# Patient Record
Sex: MALE | Race: WHITE | NOT HISPANIC OR LATINO | Employment: OTHER | URBAN - METROPOLITAN AREA
[De-identification: names, ages, dates, MRNs, and addresses within clinical notes are randomized per-mention and may not be internally consistent; named-entity substitution may affect disease eponyms.]

---

## 2024-02-19 ENCOUNTER — TELEPHONE (OUTPATIENT)
Dept: PRIMARY CARE | Facility: CLINIC | Age: 64
End: 2024-02-19

## 2024-02-19 ENCOUNTER — APPOINTMENT (OUTPATIENT)
Dept: PRIMARY CARE | Facility: CLINIC | Age: 64
End: 2024-02-19
Payer: COMMERCIAL

## 2024-02-21 RX ORDER — METOPROLOL TARTRATE 25 MG/1
TABLET, FILM COATED ORAL DAILY
COMMUNITY
Start: 2024-02-16

## 2024-02-21 RX ORDER — ACETAMINOPHEN 500 MG
1000 TABLET ORAL EVERY 8 HOURS
COMMUNITY
Start: 2023-09-25 | End: 2024-02-22 | Stop reason: ALTCHOICE

## 2024-02-21 RX ORDER — SILDENAFIL 100 MG/1
TABLET, FILM COATED ORAL
COMMUNITY
Start: 2024-01-11

## 2024-02-21 RX ORDER — CYCLOBENZAPRINE HCL 10 MG
TABLET ORAL
COMMUNITY
Start: 2023-10-11 | End: 2024-02-22 | Stop reason: ALTCHOICE

## 2024-02-21 RX ORDER — HYDROCHLOROTHIAZIDE 12.5 MG/1
CAPSULE ORAL
COMMUNITY
Start: 2023-07-07 | End: 2024-02-29 | Stop reason: ALTCHOICE

## 2024-02-21 RX ORDER — ATORVASTATIN CALCIUM 80 MG/1
TABLET, FILM COATED ORAL
COMMUNITY
Start: 2023-12-12

## 2024-02-21 RX ORDER — LISINOPRIL 20 MG/1
TABLET ORAL
COMMUNITY
Start: 2023-04-24 | End: 2024-02-29 | Stop reason: ALTCHOICE

## 2024-02-21 RX ORDER — FOLIC ACID 1 MG/1
TABLET ORAL
COMMUNITY
Start: 2023-09-26 | End: 2024-02-22 | Stop reason: ALTCHOICE

## 2024-02-21 RX ORDER — LISINOPRIL AND HYDROCHLOROTHIAZIDE 12.5; 2 MG/1; MG/1
TABLET ORAL
COMMUNITY
Start: 2023-12-16

## 2024-02-21 RX ORDER — POLYETHYLENE GLYCOL-3350 AND ELECTROLYTES 236; 6.74; 5.86; 2.97; 22.74 G/274.31G; G/274.31G; G/274.31G; G/274.31G; G/274.31G
POWDER, FOR SOLUTION ORAL
COMMUNITY
Start: 2023-03-07 | End: 2024-02-22 | Stop reason: ALTCHOICE

## 2024-02-21 RX ORDER — EZETIMIBE 10 MG/1
TABLET ORAL
COMMUNITY
Start: 2023-12-12

## 2024-02-21 RX ORDER — PREDNISONE 10 MG/1
TABLET ORAL
COMMUNITY
Start: 2023-10-12 | End: 2024-02-29 | Stop reason: ALTCHOICE

## 2024-02-22 ENCOUNTER — OFFICE VISIT (OUTPATIENT)
Dept: PRIMARY CARE | Facility: CLINIC | Age: 64
End: 2024-02-22
Payer: COMMERCIAL

## 2024-02-22 VITALS
DIASTOLIC BLOOD PRESSURE: 74 MMHG | BODY MASS INDEX: 34.44 KG/M2 | HEART RATE: 62 BPM | SYSTOLIC BLOOD PRESSURE: 157 MMHG | HEIGHT: 71 IN | WEIGHT: 246 LBS

## 2024-02-22 DIAGNOSIS — Z00.00 WELL ADULT EXAM: Primary | ICD-10-CM

## 2024-02-22 PROBLEM — I10 PRIMARY HYPERTENSION: Status: ACTIVE | Noted: 2017-01-27

## 2024-02-22 PROBLEM — E66.811 OBESITY, CLASS I, BMI 30-34.9: Status: ACTIVE | Noted: 2018-12-06

## 2024-02-22 PROBLEM — R26.9 ABNORMALITY OF GAIT: Status: ACTIVE | Noted: 2023-09-27

## 2024-02-22 PROBLEM — E66.9 OBESITY, CLASS I, BMI 30-34.9: Status: ACTIVE | Noted: 2018-12-06

## 2024-02-22 PROBLEM — N50.89 PERINEAL MASS IN MALE: Status: ACTIVE | Noted: 2021-09-24

## 2024-02-22 PROBLEM — M54.50 ACUTE MIDLINE LOW BACK PAIN WITHOUT SCIATICA: Status: ACTIVE | Noted: 2023-09-27

## 2024-02-22 PROBLEM — M54.9 INTRACTABLE BACK PAIN: Status: ACTIVE | Noted: 2023-09-23

## 2024-02-22 PROBLEM — F10.10 ALCOHOL ABUSE: Status: ACTIVE | Noted: 2022-08-15

## 2024-02-22 PROBLEM — E78.2 MIXED HYPERLIPIDEMIA: Status: ACTIVE | Noted: 2024-02-22

## 2024-02-22 PROCEDURE — 3077F SYST BP >= 140 MM HG: CPT | Performed by: FAMILY MEDICINE

## 2024-02-22 PROCEDURE — 1036F TOBACCO NON-USER: CPT | Performed by: FAMILY MEDICINE

## 2024-02-22 PROCEDURE — 3078F DIAST BP <80 MM HG: CPT | Performed by: FAMILY MEDICINE

## 2024-02-22 PROCEDURE — 99396 PREV VISIT EST AGE 40-64: CPT | Performed by: FAMILY MEDICINE

## 2024-02-22 RX ORDER — NITROGLYCERIN 0.4 MG/1
0.4 TABLET SUBLINGUAL
COMMUNITY
Start: 2023-10-02

## 2024-02-22 RX ORDER — DICLOFENAC SODIUM 10 MG/G
4 GEL TOPICAL 3 TIMES DAILY
COMMUNITY
End: 2024-02-22 | Stop reason: ALTCHOICE

## 2024-02-22 RX ORDER — ASPIRIN 81 MG/1
81 TABLET ORAL 2 TIMES DAILY
COMMUNITY
Start: 2015-03-10

## 2024-02-22 ASSESSMENT — ENCOUNTER SYMPTOMS
LOSS OF SENSATION IN FEET: 0
DEPRESSION: 1
OCCASIONAL FEELINGS OF UNSTEADINESS: 0

## 2024-02-22 NOTE — PROGRESS NOTES
Pt is here to [facility], pt has concerns of severe sweating and back pain.       Chief Complaint:  No chief complaint on file.  History Of Present Illness:   Arie Allen is a 63 y.o. male presenting as new patient          Pt retired early from work (Dec 29, 2023) due to this lower back pain. Pt is concerned about excessive scalp sweat and back sweat for 5 min then it resolves, occurring daily this month. Pt endorses increased stress lately after quitting his job (financial stressors) and his dog is in poor health, states increased anxiety for the past few weeks. He endorses anxiety attacks that last for a few minutes which he describes as “works himself up and can't stop thinking/talking about a problem.” Denies heart palpitations. He believes the sweating is related to the anxiety. Recent gain of weight (20lb) in past few months. Reports impaired sleep, difficult to fall asleep and stay asleep. Endorses difficulty relaxing, increased forgetfulness, and concentrating. No sweating with chest pain.    Reports drank last 2/9, denies binge drinking. Denies withdrawal symptoms including tremors. Pt recently weaned himself from alcohol. Denies craving alcohol.    Pt reports back pain is a stable ache. Pt states back injury in fall 2023 impacting L5-S1. Received steroid injection shots through [facility] (last shot Nov 6 2023). Saw pain doctor and spine surgeon in the past. Xray and MRI lumbar spine completed 10/23     PMH: CAD, MI 2009 with 2 stents, HTN, kidney stone, HLD, ED     PSH: Lumbar steroid injection (Nov 6 2023), cardiac stents, cyst removal     Healthcare team: Previously followed with [facility], establishing with cardiology at  2/29  ALL: NKDA  SH: Pt reports alcohol abuse previously (drank 4 doubles of [place] daily); current daily MJ use.  Lives with his wife     FH: father- heart disease, mother- cancer (uncertain)  Tobacco: Never smoker    Work: Manager (now retired).  He states that due to his  "back pain and the need to take certain medications including pain medicines he decided to stop working recently and that might be a stressor for him.     Immunizations:   - utd per pt.     Colonoscopy: 5/18/23           Review of Systems:     Negative  Constitutional:   - fever   - chills   - unexpected weight change     Eyes:   - loss of vision     Ear/Nose/Throat/Mouth:   - sore throat     Cardiovascular:   - chest pain  - palpitations       Respiratory:   - shortness of breath  - difficulty breathing  - frequent cough  - wheezing     Neurological:   - loss of consciousness    Gastrointestinal:   - abdominal pain  - nausea  - vomiting  - constipation  - diarrhea    Genitourinary:   - urinary incontinence         Positive  - changes in sleep  - night sweats   - back pain  - endorses feelings of anxiety         Last Recorded Vitals:  Vitals:    02/22/24 1537   BP: 157/74   Pulse: 62   Weight: 112 kg (246 lb)   Height: 1.803 m (5' 11\")        Past Medical History:  He has no past medical history on file.     Past Surgical History:  He has no past surgical history on file.     Social History:  He reports that he has never smoked. He has never used smokeless tobacco. He reports current drug use. Frequency: 8.00 times per week. Drug: Marijuana. No history on file for alcohol use.     Family History:  No family history on file.  Allergies:  Patient has no known allergies.     Outpatient Medications:  Current Outpatient Medications   Medication Instructions    aspirin (ECOTRIN LOW STRENGTH) 81 mg, oral, 2 times daily    atorvastatin (Lipitor) 80 mg tablet     ezetimibe (Zetia) 10 mg tablet     hydroCHLOROthiazide (Microzide) 12.5 mg capsule     lisinopril 20 mg tablet     lisinopriL-hydrochlorothiazide 20-12.5 mg tablet     metoprolol tartrate (Lopressor) 25 mg tablet     nitroglycerin (NITROSTAT) 0.4 mg, sublingual    predniSONE (Deltasone) 10 mg tablet TAKE 6 TABLETS BY MOUTH ONCE DAILY FOR 2 DAYS  THEN TAKE 5 TABLETS " "ONCE DAILY FOR 2 DAYS  THEN TAKE 4 TABLETS ONCE DAILY FOR 2 DAYS  THEN TA    sildenafil (Viagra) 100 mg tablet TAKE 1 TABLET BY MOUTH AS NEEDED. DO NOT TAKE WITH NITRO (FOR THE HEART).        Physical Exam:  GENERAL: Well developed, well nourished, alert and cooperative, and appears to be in no acute distress.     PSYCH: mood pleasant and appropriate     HEAD: normocephalic     EYES: PERRL, EOMI. vision is grossly intact.     EARS: Hearing grossly intact.     CARDIAC:   RRR.   No murmur, rubs, gallop.     LUNGS: Good respiratory effort.  Clear to auscultation b/l without rales, rhonchi, wheezing.     ABD: soft, nontender  no masses palpated.     GAIT: Normal      EXTREMITIES: All 4 extremities are warm and well perfused.   Peripheral pulses intact.      NEUROLOGICAL: Strength and sensation symmetric and intact throughout all 4 extremities.    SKIN: Skin normal color  no lesions or eruptions.      Last Labs:  CBC -  No results found for: \"WBC\", \"HGB\", \"HCT\", \"MCV\", \"PLT\"     CMP -  No results found for: \"CALCIUM\", \"PHOS\", \"PROT\", \"ALBUMIN\", \"AST\", \"ALT\", \"ALKPHOS\", \"BILITOT\"     LIPID PANEL -  No results found for: \"CHOL\", \"TRIG\", \"HDL\", \"CHHDL\", \"LDLF\", \"VLDL\", \"NHDL\"        Lab Results   Component Value Date    HGBA1C 5.3 08/15/2022          No image results found.         Assessment/Plan   Problem List Items Addressed This Visit             ICD-10-CM    Well adult exam - Primary Z00.00     Welcome to the office. Please let the office know at least one week in advance if you need an immunization or booster shot.     Fasting lab work was ordered today. It is likely that your insurance will cover the testing, but if you have any concerns- please check with your insurance company before getting the blood work drawn. I will call you when I get the results.   Please do not eat for 12 hours before getting your blood work drawn (water is ok).     Make sure to keep the appointment with your new cardiologist.    Regarding " your back pain, today it is more of an ache so we agreed that we do not need to do a referral right now but in the future we may refer you to a spine specialist or even a pain management specialist.    Pending your blood work results we will follow-up with you in 1 month.    For your anxiety, this might be problem-focused due to your recent stopping of work, but you have a good support system and you declined medications or referrals today at this visit.    Regarding your sweating, recommend over-the-counter Drysol over the most worrisome areas but not over your entire body.    Regarding referrals, you can call the following phone number to attempt to schedule: [phone].  Another potential phone number is: [phone] ([phone]).  The number for pediatric referrals is: [phone].           Obdulio Bradford, DO

## 2024-02-23 ENCOUNTER — LAB (OUTPATIENT)
Dept: LAB | Facility: LAB | Age: 64
End: 2024-02-23
Payer: COMMERCIAL

## 2024-02-23 DIAGNOSIS — Z00.00 WELL ADULT EXAM: ICD-10-CM

## 2024-02-23 LAB
ALBUMIN SERPL BCP-MCNC: 4.3 G/DL (ref 3.4–5)
ALP SERPL-CCNC: 70 U/L (ref 33–136)
ALT SERPL W P-5'-P-CCNC: 59 U/L (ref 10–52)
ANION GAP SERPL CALC-SCNC: 14 MMOL/L (ref 10–20)
AST SERPL W P-5'-P-CCNC: 25 U/L (ref 9–39)
BILIRUB SERPL-MCNC: 0.6 MG/DL (ref 0–1.2)
BUN SERPL-MCNC: 15 MG/DL (ref 6–23)
CALCIUM SERPL-MCNC: 9.5 MG/DL (ref 8.6–10.6)
CHLORIDE SERPL-SCNC: 108 MMOL/L (ref 98–107)
CHOLEST SERPL-MCNC: 107 MG/DL (ref 0–199)
CHOLESTEROL/HDL RATIO: 3
CO2 SERPL-SCNC: 28 MMOL/L (ref 21–32)
CREAT SERPL-MCNC: 0.98 MG/DL (ref 0.5–1.3)
EGFRCR SERPLBLD CKD-EPI 2021: 87 ML/MIN/1.73M*2
ERYTHROCYTE [DISTWIDTH] IN BLOOD BY AUTOMATED COUNT: 13.6 % (ref 11.5–14.5)
EST. AVERAGE GLUCOSE BLD GHB EST-MCNC: 108 MG/DL
GLUCOSE SERPL-MCNC: 106 MG/DL (ref 74–99)
HBA1C MFR BLD: 5.4 %
HCT VFR BLD AUTO: 43.1 % (ref 41–52)
HDLC SERPL-MCNC: 35.8 MG/DL
HGB BLD-MCNC: 14.5 G/DL (ref 13.5–17.5)
LDLC SERPL CALC-MCNC: 53 MG/DL
MCH RBC QN AUTO: 30.7 PG (ref 26–34)
MCHC RBC AUTO-ENTMCNC: 33.6 G/DL (ref 32–36)
MCV RBC AUTO: 91 FL (ref 80–100)
NON HDL CHOLESTEROL: 71 MG/DL (ref 0–149)
NRBC BLD-RTO: 0 /100 WBCS (ref 0–0)
PLATELET # BLD AUTO: 159 X10*3/UL (ref 150–450)
POTASSIUM SERPL-SCNC: 4.5 MMOL/L (ref 3.5–5.3)
PROT SERPL-MCNC: 6.7 G/DL (ref 6.4–8.2)
PSA SERPL-MCNC: 0.53 NG/ML
RBC # BLD AUTO: 4.73 X10*6/UL (ref 4.5–5.9)
SODIUM SERPL-SCNC: 145 MMOL/L (ref 136–145)
TRIGL SERPL-MCNC: 92 MG/DL (ref 0–149)
TSH SERPL-ACNC: 0.76 MIU/L (ref 0.44–3.98)
VLDL: 18 MG/DL (ref 0–40)
WBC # BLD AUTO: 5.9 X10*3/UL (ref 4.4–11.3)

## 2024-02-23 PROCEDURE — 80053 COMPREHEN METABOLIC PANEL: CPT

## 2024-02-23 PROCEDURE — 85027 COMPLETE CBC AUTOMATED: CPT

## 2024-02-23 PROCEDURE — 80061 LIPID PANEL: CPT

## 2024-02-23 PROCEDURE — 84153 ASSAY OF PSA TOTAL: CPT

## 2024-02-23 PROCEDURE — 84443 ASSAY THYROID STIM HORMONE: CPT

## 2024-02-23 PROCEDURE — 36415 COLL VENOUS BLD VENIPUNCTURE: CPT

## 2024-02-23 PROCEDURE — 83036 HEMOGLOBIN GLYCOSYLATED A1C: CPT

## 2024-02-26 ENCOUNTER — TELEPHONE (OUTPATIENT)
Dept: PRIMARY CARE | Facility: CLINIC | Age: 64
End: 2024-02-26
Payer: COMMERCIAL

## 2024-02-26 NOTE — TELEPHONE ENCOUNTER
Spoke to patient     ----- Message from Obdulio Bradford, DO sent at 2/26/2024  2:28 PM EST -----  Labs look good. Keep follow up with me later this month.

## 2024-02-29 ENCOUNTER — OFFICE VISIT (OUTPATIENT)
Dept: CARDIOLOGY | Facility: CLINIC | Age: 64
End: 2024-02-29
Payer: COMMERCIAL

## 2024-02-29 VITALS
HEART RATE: 61 BPM | WEIGHT: 244 LBS | DIASTOLIC BLOOD PRESSURE: 80 MMHG | HEIGHT: 71 IN | BODY MASS INDEX: 34.16 KG/M2 | OXYGEN SATURATION: 98 % | RESPIRATION RATE: 18 BRPM | SYSTOLIC BLOOD PRESSURE: 150 MMHG

## 2024-02-29 DIAGNOSIS — R06.09 DOE (DYSPNEA ON EXERTION): ICD-10-CM

## 2024-02-29 DIAGNOSIS — Z95.5 HISTORY OF HEART ARTERY STENT: ICD-10-CM

## 2024-02-29 DIAGNOSIS — I25.118 CORONARY ARTERY DISEASE OF NATIVE ARTERY OF NATIVE HEART WITH STABLE ANGINA PECTORIS (CMS-HCC): Primary | ICD-10-CM

## 2024-02-29 PROCEDURE — 3079F DIAST BP 80-89 MM HG: CPT | Performed by: STUDENT IN AN ORGANIZED HEALTH CARE EDUCATION/TRAINING PROGRAM

## 2024-02-29 PROCEDURE — 3077F SYST BP >= 140 MM HG: CPT | Performed by: STUDENT IN AN ORGANIZED HEALTH CARE EDUCATION/TRAINING PROGRAM

## 2024-02-29 PROCEDURE — 99205 OFFICE O/P NEW HI 60 MIN: CPT | Performed by: STUDENT IN AN ORGANIZED HEALTH CARE EDUCATION/TRAINING PROGRAM

## 2024-02-29 PROCEDURE — 1036F TOBACCO NON-USER: CPT | Performed by: STUDENT IN AN ORGANIZED HEALTH CARE EDUCATION/TRAINING PROGRAM

## 2024-02-29 RX ORDER — REGADENOSON 0.08 MG/ML
0.4 INJECTION, SOLUTION INTRAVENOUS
Status: CANCELLED | OUTPATIENT
Start: 2024-02-29

## 2024-02-29 ASSESSMENT — ENCOUNTER SYMPTOMS
DEPRESSION: 0
OCCASIONAL FEELINGS OF UNSTEADINESS: 0
LOSS OF SENSATION IN FEET: 0

## 2024-02-29 ASSESSMENT — PAIN SCALES - GENERAL: PAINLEVEL: 0-NO PAIN

## 2024-02-29 NOTE — PROGRESS NOTES
Referred by Dr. Bradford for New Patient Visit (Establish care), Coronary Artery Disease, Hypertension, and Hyperlipidemia     HPI:    Arie Allen is a 63 y.o. male with pertinent history of hypertension, dyslipidemia, osteoarthritis, back pain, history of obstructive coronary artery disease status post stenting with bare-metal stent March 2009 with in-stent restenosis drug-eluting stent July 2009 presents to cardiology clinic to establish care.     The patient recently retired due to back pain and is transitioning care to [facility].  He was previously seen by Charlie Rhoades MD of cardiology at [facility].  Reviewed discussed his prior cardiac history.  He does note episodes of diaphoresis in the morning more recently.  He also notes some increased dyspnea on exertion.  Back pain has been limiting activities.  No clear chest discomfort.  No exacerbating or relieving factors.  Pt denies orthopnea, and paroxysmal nocturnal dyspnea.  Pt denies worsening lower extremity edema.  Pt denies palpitations or syncope.  No recent falls.  No fever or chills.  No cough.  No change in bowel or bladder habits.  No sick contacts.  No recent travel.    12 point review of systems including (Constitutional, Eyes, ENMT, Respiratory, Cardiac, Gastrointestinal, Neurological, Psychiatric, and Hematologic) was performed and is otherwise negative.    Past medical history reviewed    Past surgical history reviewed    Social history reviewed:   reports that he has never smoked. He has never used smokeless tobacco. He reports that he does not currently use alcohol. He reports current drug use. Frequency: 8.00 times per week. Drug: Marijuana.     Family history:  No sudden cardiac death or premature coronary artery disease.      Allergies reviewed: Patient has no known allergies.     Medications reviewed:   Current Outpatient Medications   Medication Instructions    aspirin (ECOTRIN LOW STRENGTH) 81 mg, oral, 2 times  daily    atorvastatin (Lipitor) 80 mg tablet     ezetimibe (Zetia) 10 mg tablet     lisinopriL-hydrochlorothiazide 20-12.5 mg tablet     metoprolol tartrate (Lopressor) 25 mg tablet Take by mouth once daily.    nitroglycerin (NITROSTAT) 0.4 mg, sublingual    sildenafil (Viagra) 100 mg tablet TAKE 1 TABLET BY MOUTH AS NEEDED. DO NOT TAKE WITH NITRO (FOR THE HEART).        Vitals reviewed: Visit Vitals  /80   Pulse 61   Resp 18       Physical Exam:   General:  Patient is awake, alert, and oriented.  Patient is in no acute distress.  HEENT:  Pupils equal and reactive.  Normocephalic.  Moist mucosa.    Neck:  No thyromegaly.  Normal Jugular Venous Pressure.  Cardiovascular:  Regular rate and rhythm.  Normal S1 and S2.  1/6 KARIN.  Pulmonary:  Clear to auscultation bilaterally.  Abdomen:  Soft. Non-tender.   Non-distended.  Positive bowel sounds.  Lower Extremities:  2+ pedal pulses. No LE edema.  Neurologic:  Cranial nerves intact.  No focal deficit.   Skin: Skin warm and dry, normal skin turgor.   Psychiatric: Normal affect.    Last Labs:  CBC -      Lab Results   Component Value Date    WBC 5.9 02/23/2024    HGB 14.5 02/23/2024    HCT 43.1 02/23/2024     02/23/2024        CMP-  Lab Results   Component Value Date    GLUCOSE 106 (H) 02/23/2024     02/23/2024    K 4.5 02/23/2024     (H) 02/23/2024    CO2 28 02/23/2024    ANIONGAP 14 02/23/2024    BUN 15 02/23/2024    CREATININE 0.98 02/23/2024    EGFR 87 02/23/2024    CALCIUM 9.5 02/23/2024    PROT 6.7 02/23/2024    ALBUMIN 4.3 02/23/2024    AST 25 02/23/2024    ALT 59 (H) 02/23/2024    ALKPHOS 70 02/23/2024    BILITOT 0.6 02/23/2024        LIPIDS-  Lab Results   Component Value Date    CHOL 107 02/23/2024    TRIG 92 02/23/2024    HDL 35.8 02/23/2024    CHHDL 3.0 02/23/2024    VLDL 18 02/23/2024        OTHERS-  Lab Results   Component Value Date    HGBA1C 5.4 02/23/2024        I personally reviewed the patient's recent vitals, labs, medications,  orders, EKGs, pertinent cardiac imaging/ echocardiography and ischemic evaluations including stress testing/ cardiac catheterization.    Assessment and Plan:    Arie Allen is a 63 y.o. male with pertinent history of hypertension, dyslipidemia, osteoarthritis, back pain, history of obstructive coronary artery disease status post stenting with bare-metal stent March 2009 with in-stent restenosis drug-eluting stent July 2009 presents to cardiology clinic to establish care. The patient recently retired due to back pain and is transitioning care to [facility].  He was previously seen by Charlie Rhoades MD of cardiology at [facility].  Reviewed discussed his prior cardiac history.  He does note episodes of diaphoresis in the morning more recently.  He also notes some increased dyspnea on exertion.  Back pain has been limiting activities.  No clear chest discomfort.      We will obtain a transthoracic echocardiogram for structural evaluation including ejection fraction, assessment of regional wall motion abnormalities or valvular disease, and further evaluation of hemodynamics.    The patient is unable to complete treadmill excercise stress testing with less than 4 METS of functional capacity due to comorbidities including significant osteoarthritis.  Given the patient's risk factors and symptoms, we will obtain a nuclear pharmacologic stress test for further ischemic evaluation. Please avoid caffeine the day prior to and the day of the stress test. Do not eat the morning of the stress test or 12 hours prior.  Please do not take Metoprolol the morning of your stress test.  You can continue all other medications.    Please continue current cardiac medication including aspirin 81 mg daily, atorvastatin 80 mg daily, ezetimibe 10 mg daily, combination lisinopril 20 mg hydrochlorothiazide 12.5 mg daily, Toprol tartrate 25 mg, sublingual nitroglycerin as needed for chest pain.    Please followup with  me in Cardiology clinic within the next 2 months.  Please return to clinic sooner or seek emergent care if your symptoms reoccur or worsen.    Thank you for allowing me to participate in their care.  Please feel free to call me with any further questions or concerns.        Lc Burt MD, FAC, [facility]  Division of Cardiovascular Medicine  Medical Director, [facility] [place]  Clinical , [employer]  [email or web address]   Office:  [phone]

## 2024-02-29 NOTE — PATIENT INSTRUCTIONS
We will obtain a transthoracic echocardiogram for structural evaluation including ejection fraction, assessment of regional wall motion abnormalities or valvular disease, and further evaluation of hemodynamics.    We will obtain a nuclear pharmacologic stress test for further ischemic evaluation. Please avoid caffeine the day prior to and the day of the stress test. Do not eat the morning of the stress test or 12 hours prior.  Please do not take Metoprolol the morning of your stress test.  You can continue all other medications.    Please continue current cardiac medication including aspirin 81 mg daily, atorvastatin 80 mg daily, ezetimibe 10 mg daily, combination lisinopril 20 mg hydrochlorothiazide 12.5 mg daily, Toprol tartrate 25 mg, sublingual nitroglycerin as needed for chest pain.    Please followup with me in Cardiology clinic within the next 2 months.  Please return to clinic sooner or seek emergent care if your symptoms reoccur or worsen.

## 2024-03-04 ENCOUNTER — APPOINTMENT (OUTPATIENT)
Dept: PRIMARY CARE | Facility: CLINIC | Age: 64
End: 2024-03-04
Payer: COMMERCIAL

## 2024-03-13 ENCOUNTER — ANCILLARY PROCEDURE (OUTPATIENT)
Dept: CARDIOLOGY | Facility: CLINIC | Age: 64
End: 2024-03-13
Payer: COMMERCIAL

## 2024-03-13 VITALS — HEIGHT: 71 IN | WEIGHT: 244 LBS | BODY MASS INDEX: 34.16 KG/M2

## 2024-03-13 DIAGNOSIS — R06.00 DYSPNEA, UNSPECIFIED: ICD-10-CM

## 2024-03-13 DIAGNOSIS — I25.118 CORONARY ARTERY DISEASE OF NATIVE ARTERY OF NATIVE HEART WITH STABLE ANGINA PECTORIS (CMS-HCC): ICD-10-CM

## 2024-03-13 DIAGNOSIS — R06.09 DOE (DYSPNEA ON EXERTION): ICD-10-CM

## 2024-03-13 DIAGNOSIS — Z95.5 HISTORY OF HEART ARTERY STENT: ICD-10-CM

## 2024-03-13 DIAGNOSIS — I25.10 ATHEROSCLEROTIC HEART DISEASE OF NATIVE CORONARY ARTERY WITHOUT ANGINA PECTORIS: ICD-10-CM

## 2024-03-13 LAB
AORTIC VALVE PEAK VELOCITY: 1.37 M/S
AV PEAK GRADIENT: 7.5 MMHG
AVA (PEAK VEL): 2.44 CM2
EJECTION FRACTION APICAL 4 CHAMBER: 43.5
EJECTION FRACTION: 57 %
LEFT ATRIUM VOLUME AREA LENGTH INDEX BSA: 30.5 ML/M2
LEFT VENTRICLE INTERNAL DIMENSION DIASTOLE: 4.5 CM (ref 3.5–6)
LEFT VENTRICULAR OUTFLOW TRACT DIAMETER: 2.12 CM
RIGHT VENTRICLE FREE WALL PEAK S': 10 CM/S

## 2024-03-13 PROCEDURE — 93306 TTE W/DOPPLER COMPLETE: CPT | Performed by: STUDENT IN AN ORGANIZED HEALTH CARE EDUCATION/TRAINING PROGRAM

## 2024-03-13 PROCEDURE — 93306 TTE W/DOPPLER COMPLETE: CPT

## 2024-03-21 ENCOUNTER — OFFICE VISIT (OUTPATIENT)
Dept: PRIMARY CARE | Facility: CLINIC | Age: 64
End: 2024-03-21
Payer: COMMERCIAL

## 2024-03-21 VITALS — BODY MASS INDEX: 33.94 KG/M2 | WEIGHT: 242.4 LBS | HEIGHT: 71 IN

## 2024-03-21 DIAGNOSIS — E78.5 HYPERLIPIDEMIA, UNSPECIFIED HYPERLIPIDEMIA TYPE: ICD-10-CM

## 2024-03-21 DIAGNOSIS — I10 ESSENTIAL HYPERTENSION: Primary | ICD-10-CM

## 2024-03-21 PROCEDURE — 99213 OFFICE O/P EST LOW 20 MIN: CPT | Performed by: FAMILY MEDICINE

## 2024-03-21 PROCEDURE — 1036F TOBACCO NON-USER: CPT | Performed by: FAMILY MEDICINE

## 2024-03-21 ASSESSMENT — ENCOUNTER SYMPTOMS
OCCASIONAL FEELINGS OF UNSTEADINESS: 0
LOSS OF SENSATION IN FEET: 0
DEPRESSION: 0

## 2024-03-21 NOTE — PROGRESS NOTES
PT IS HERE FOR FUV, NO CONCERNS. NEEDS MANUAL BP CHECK.       Chief Complaint:  No chief complaint on file.  History Of Present Illness:   Arie Allen is a 63 y.o. male     Arie Allen is a 63 y.o. male presenting for follow up.    He is feeling good today.      CAD- Dr. Burt     Pt retired early from work (Dec 29, 2023) due to this lower back pain. Pt is concerned about excessive scalp sweat and back sweat for 5 min then it resolves, occurring daily this month.   March 2024 update: sweating has improved.     Pt endorses increased stress lately after quitting his job (financial stressors) and his dog is in poor health, states increased anxiety for the past few weeks. He endorses anxiety attacks that last for a few minutes which he describes as “works himself up and can't stop thinking/talking about a problem.” Denies heart palpitations. He believes the sweating is related to the anxiety. Recent gain of weight (20lb) in past few months.   March 2024 update: stress is improved. Older dog is unhealthy, young dog is healthy.      Reports drank last 2/9, denies binge drinking. Denies withdrawal symptoms including tremors. Pt recently weaned himself from alcohol. Denies craving alcohol. March 2024 update: had a couple drinks one night with a friend.      Pt reports back pain is a stable ache. Pt states back injury in fall 2023 impacting L5-S1. Received steroid injection shots through [facility] (last shot Nov 6 2023). Saw pain doctor and spine surgeon in the past. Xray and MRI lumbar spine completed 10/23.  March 2024 update: he doesn't want pills or surgery. He will do activity modifications.      PMH: CAD, MI 2009 with 2 stents, HTN, kidney stone, HLD, ED     PSH: Lumbar steroid injection (Nov 6 2023), cardiac stents, cyst removal     Healthcare team: Previously followed with [facility], establishing with cardiology at  2/29  ALL: NKDA  SH: Pt reports alcohol abuse previously (drank 4 doubles of [place]  "daily); current daily MJ use.  Lives with his wife     FH: father- heart disease, mother- cancer (uncertain)  Tobacco: Never smoker     Work: Manager (now retired).  He states that due to his back pain and the need to take certain medications including pain medicines he decided to stop working recently and that might be a stressor for him.     Immunizations:   - utd per pt.     Colonoscopy: 5/18/23           Review of Systems:     Negative  Constitutional:   - fever   - chills   - unexpected weight change     Eyes:   - loss of vision     Ear/Nose/Throat/Mouth:   - sore throat     Cardiovascular:   - chest pain  - palpitations        Respiratory:   - shortness of breath  - difficulty breathing  - frequent cough  - wheezing     Neurological:   - loss of consciousness     Gastrointestinal:   - abdominal pain  - nausea  - vomiting  - constipation  - diarrhea     Genitourinary:   - urinary incontinence          Positive  - changes in sleep  - night sweats   - back pain  - endorses feelings of anxiety       Last Recorded Vitals:  Vitals:    03/21/24 1520   Weight: 110 kg (242 lb 6.4 oz)   Height: 1.803 m (5' 11\")        Past Medical History:  He has no past medical history on file.     Past Surgical History:  He has no past surgical history on file.     Social History:  He reports that he has never smoked. He has never used smokeless tobacco. He reports that he does not currently use alcohol. He reports current drug use. Frequency: 8.00 times per week. Drug: Marijuana.     Family History:  No family history on file.  Allergies:  Patient has no known allergies.     Outpatient Medications:  Current Outpatient Medications   Medication Instructions    aspirin (ECOTRIN LOW STRENGTH) 81 mg, oral, 2 times daily    atorvastatin (Lipitor) 80 mg tablet     ezetimibe (Zetia) 10 mg tablet     lisinopriL-hydrochlorothiazide 20-12.5 mg tablet     metoprolol tartrate (Lopressor) 25 mg tablet Take by mouth once daily.    nitroglycerin " (NITROSTAT) 0.4 mg, sublingual    sildenafil (Viagra) 100 mg tablet TAKE 1 TABLET BY MOUTH AS NEEDED. DO NOT TAKE WITH NITRO (FOR THE HEART).            Physical Exam:      121/75 manual in office today    GENERAL: Well developed, well nourished, alert and cooperative, and appears to be in no acute distress.     PSYCH: mood pleasant and appropriate     HEAD: normocephalic     EYES: PERRL, EOMI. vision is grossly intact.      NOSE:  Nares patent b/l.   No bleeding nasal polyps. No nasal discharge.     THROAT:    Oropharynx clear.       NECK: Neck supple, non-tender.   No masses, no thyromegaly.     CARDIAC:   RRR.        LUNGS: Good respiratory effort.  Clear to auscultation b/l without rales, rhonchi, wheezing.     ABD: soft, nontender         GAIT: Normal               EXTREMITIES: All 4 extremities are warm and well perfused.   Peripheral pulses intact.   No varicosities.   No cyanosis, no pallor.   No peripheral edema.     NEUROLOGICAL: Strength and sensation symmetric and intact throughout all 4 extremities.  CN II-XII grossly intact.    SKIN: Skin normal color  no lesions or eruptions.      Last Labs:  CBC -  Lab Results   Component Value Date    WBC 5.9 02/23/2024    HGB 14.5 02/23/2024    HCT 43.1 02/23/2024    MCV 91 02/23/2024     02/23/2024        CMP -  Lab Results   Component Value Date    CALCIUM 9.5 02/23/2024    PROT 6.7 02/23/2024    ALBUMIN 4.3 02/23/2024    AST 25 02/23/2024    ALT 59 (H) 02/23/2024    ALKPHOS 70 02/23/2024    BILITOT 0.6 02/23/2024        LIPID PANEL -  Lab Results   Component Value Date    CHOL 107 02/23/2024    TRIG 92 02/23/2024    HDL 35.8 02/23/2024    CHHDL 3.0 02/23/2024    VLDL 18 02/23/2024    NHDL 71 02/23/2024           Lab Results   Component Value Date    HGBA1C 5.4 02/23/2024         Assessment/Plan          Your blood work in Feb looked good.     Continue with your cardiologist.     Regarding your back pain, agreed to monitor for now, you already have a  cabinet full of potential muscle relaxers and patches.   If worsens- consider follow up here, we may send you to pain management.     Anxiety is improved today, continue to monitor.      Follow up as needed, or in 6 months.       Obdulio Bradford, DO

## 2024-03-22 ENCOUNTER — HOSPITAL ENCOUNTER (OUTPATIENT)
Dept: RADIOLOGY | Facility: HOSPITAL | Age: 64
Discharge: HOME | End: 2024-03-22
Payer: COMMERCIAL

## 2024-03-22 ENCOUNTER — HOSPITAL ENCOUNTER (OUTPATIENT)
Dept: CARDIOLOGY | Facility: HOSPITAL | Age: 64
Discharge: HOME | End: 2024-03-22
Payer: COMMERCIAL

## 2024-03-22 DIAGNOSIS — I25.118 CORONARY ARTERY DISEASE OF NATIVE ARTERY OF NATIVE HEART WITH STABLE ANGINA PECTORIS (CMS-HCC): ICD-10-CM

## 2024-03-22 DIAGNOSIS — Z95.5 HISTORY OF HEART ARTERY STENT: ICD-10-CM

## 2024-03-22 DIAGNOSIS — R06.09 DOE (DYSPNEA ON EXERTION): ICD-10-CM

## 2024-03-22 PROCEDURE — 78452 HT MUSCLE IMAGE SPECT MULT: CPT | Performed by: NUCLEAR MEDICINE

## 2024-03-22 PROCEDURE — 78452 HT MUSCLE IMAGE SPECT MULT: CPT

## 2024-03-22 PROCEDURE — 3430000001 HC RX 343 DIAGNOSTIC RADIOPHARMACEUTICALS: Performed by: STUDENT IN AN ORGANIZED HEALTH CARE EDUCATION/TRAINING PROGRAM

## 2024-03-22 PROCEDURE — A9502 TC99M TETROFOSMIN: HCPCS | Performed by: STUDENT IN AN ORGANIZED HEALTH CARE EDUCATION/TRAINING PROGRAM

## 2024-03-22 PROCEDURE — 2500000004 HC RX 250 GENERAL PHARMACY W/ HCPCS (ALT 636 FOR OP/ED): Performed by: STUDENT IN AN ORGANIZED HEALTH CARE EDUCATION/TRAINING PROGRAM

## 2024-03-22 PROCEDURE — 93017 CV STRESS TEST TRACING ONLY: CPT

## 2024-03-22 RX ORDER — REGADENOSON 0.08 MG/ML
0.4 INJECTION, SOLUTION INTRAVENOUS
Status: COMPLETED | OUTPATIENT
Start: 2024-03-22 | End: 2024-03-22

## 2024-03-22 RX ADMIN — TETROFOSMIN 10.7 MILLICURIE: 0.23 INJECTION, POWDER, LYOPHILIZED, FOR SOLUTION INTRAVENOUS at 09:25

## 2024-03-22 RX ADMIN — TETROFOSMIN 34.2 MILLICURIE: 0.23 INJECTION, POWDER, LYOPHILIZED, FOR SOLUTION INTRAVENOUS at 11:00

## 2024-03-22 RX ADMIN — REGADENOSON 0.4 MG: 0.08 INJECTION, SOLUTION INTRAVENOUS at 11:02

## 2024-04-29 ENCOUNTER — OFFICE VISIT (OUTPATIENT)
Dept: CARDIOLOGY | Facility: CLINIC | Age: 64
End: 2024-04-29
Payer: COMMERCIAL

## 2024-04-29 VITALS
WEIGHT: 231 LBS | HEART RATE: 60 BPM | HEIGHT: 71 IN | DIASTOLIC BLOOD PRESSURE: 66 MMHG | BODY MASS INDEX: 32.34 KG/M2 | OXYGEN SATURATION: 96 % | SYSTOLIC BLOOD PRESSURE: 116 MMHG

## 2024-04-29 DIAGNOSIS — I10 PRIMARY HYPERTENSION: ICD-10-CM

## 2024-04-29 DIAGNOSIS — I25.118 CORONARY ARTERY DISEASE OF NATIVE ARTERY OF NATIVE HEART WITH STABLE ANGINA PECTORIS (CMS-HCC): Primary | ICD-10-CM

## 2024-04-29 DIAGNOSIS — R06.09 DOE (DYSPNEA ON EXERTION): ICD-10-CM

## 2024-04-29 DIAGNOSIS — E78.2 MIXED HYPERLIPIDEMIA: ICD-10-CM

## 2024-04-29 DIAGNOSIS — I25.10 ARTERIOSCLEROSIS OF CORONARY ARTERY: ICD-10-CM

## 2024-04-29 DIAGNOSIS — Z95.5 HISTORY OF HEART ARTERY STENT: ICD-10-CM

## 2024-04-29 DIAGNOSIS — I10 ESSENTIAL HYPERTENSION: ICD-10-CM

## 2024-04-29 PROCEDURE — 1036F TOBACCO NON-USER: CPT | Performed by: STUDENT IN AN ORGANIZED HEALTH CARE EDUCATION/TRAINING PROGRAM

## 2024-04-29 PROCEDURE — 99215 OFFICE O/P EST HI 40 MIN: CPT | Performed by: STUDENT IN AN ORGANIZED HEALTH CARE EDUCATION/TRAINING PROGRAM

## 2024-04-29 PROCEDURE — 3074F SYST BP LT 130 MM HG: CPT | Performed by: STUDENT IN AN ORGANIZED HEALTH CARE EDUCATION/TRAINING PROGRAM

## 2024-04-29 PROCEDURE — 3078F DIAST BP <80 MM HG: CPT | Performed by: STUDENT IN AN ORGANIZED HEALTH CARE EDUCATION/TRAINING PROGRAM

## 2024-04-29 ASSESSMENT — ENCOUNTER SYMPTOMS
LOSS OF SENSATION IN FEET: 0
DEPRESSION: 0
OCCASIONAL FEELINGS OF UNSTEADINESS: 0

## 2024-04-29 ASSESSMENT — PAIN SCALES - GENERAL: PAINLEVEL: 0-NO PAIN

## 2024-04-29 NOTE — PATIENT INSTRUCTIONS
Please continue current cardiac medication including aspirin 81 mg daily, atorvastatin 80 mg daily, ezetimibe 10 mg daily, combination lisinopril 20 mg hydrochlorothiazide 12.5 mg daily, Toprol tartrate 25 mg, sublingual nitroglycerin as needed for chest pain.    Please followup with me in Cardiology clinic within the next 7-8 months.  Please return to clinic sooner or seek emergent care if your symptoms reoccur or worsen.

## 2024-04-29 NOTE — PROGRESS NOTES
Follow-up     HPI:    Arie Allen is a 64 y.o. male with pertinent history of hypertension, dyslipidemia, osteoarthritis, back pain, history of obstructive coronary artery disease status post stenting with bare-metal stent March 2009 with in-stent restenosis drug-eluting stent July 2009, preserved ejection fraction 55 to 60% with impaired relaxation echo performed 3/13/2024, no clear ischemia with LVEF of 61% on nuclear stress test performed 3/22/2024 presents to cardiology clinic for follow-up.     He is doing relatively well.  Early morning diaphoresis has largely improved.  We reviewed and discussed his recent echo and stress test results.  No clear chest discomfort.  No exacerbating or relieving factors.  Pt denies orthopnea, and paroxysmal nocturnal dyspnea.  Pt denies worsening lower extremity edema.  Pt denies palpitations or syncope.  No recent falls.  No fever or chills.  No cough.  No change in bowel or bladder habits.  No sick contacts.  No recent travel.    12 point review of systems including (Constitutional, Eyes, ENMT, Respiratory, Cardiac, Gastrointestinal, Neurological, Psychiatric, and Hematologic) was performed and is otherwise negative.    Past medical history reviewed    Past surgical history reviewed    Social history reviewed:   reports that he has quit smoking. His smoking use included cigarettes. He started smoking about 15 years ago. He has never used smokeless tobacco. He reports current alcohol use. He reports current drug use. Frequency: 8.00 times per week. Drug: Marijuana.     Family history:  No sudden cardiac death or premature coronary artery disease.      Allergies reviewed: Patient has no known allergies.     Medications reviewed:   Current Outpatient Medications   Medication Instructions    aspirin (ECOTRIN LOW STRENGTH) 81 mg, oral, 2 times daily    atorvastatin (Lipitor) 80 mg tablet     ezetimibe (Zetia) 10 mg tablet     lisinopriL-hydrochlorothiazide 20-12.5 mg tablet      metoprolol tartrate (Lopressor) 25 mg tablet Take by mouth once daily.    nitroglycerin (NITROSTAT) 0.4 mg, sublingual    sildenafil (Viagra) 100 mg tablet TAKE 1 TABLET BY MOUTH AS NEEDED. DO NOT TAKE WITH NITRO (FOR THE HEART).        Vitals reviewed: Visit Vitals  /66 (Patient Position: Sitting)   Pulse 60       Physical Exam:   General:  Patient is awake, alert, and oriented.  Patient is in no acute distress.  HEENT:  Pupils equal and reactive.  Normocephalic.  Moist mucosa.    Neck:  No thyromegaly.  Normal Jugular Venous Pressure.  Cardiovascular:  Regular rate and rhythm.  Normal S1 and S2.  1/6 KARIN.  Pulmonary:  Clear to auscultation bilaterally.  Abdomen:  Soft. Non-tender.   Non-distended.  Positive bowel sounds.  Lower Extremities:  2+ pedal pulses. No LE edema.  Neurologic:  Cranial nerves intact.  No focal deficit.   Skin: Skin warm and dry, normal skin turgor.   Psychiatric: Normal affect.    Last Labs:  CBC -      Lab Results   Component Value Date    WBC 5.9 02/23/2024    HGB 14.5 02/23/2024    HCT 43.1 02/23/2024     02/23/2024        CMP-  Lab Results   Component Value Date    GLUCOSE 106 (H) 02/23/2024     02/23/2024    K 4.5 02/23/2024     (H) 02/23/2024    CO2 28 02/23/2024    ANIONGAP 14 02/23/2024    BUN 15 02/23/2024    CREATININE 0.98 02/23/2024    EGFR 87 02/23/2024    CALCIUM 9.5 02/23/2024    PROT 6.7 02/23/2024    ALBUMIN 4.3 02/23/2024    AST 25 02/23/2024    ALT 59 (H) 02/23/2024    ALKPHOS 70 02/23/2024    BILITOT 0.6 02/23/2024        LIPIDS-  Lab Results   Component Value Date    CHOL 107 02/23/2024    TRIG 92 02/23/2024    HDL 35.8 02/23/2024    CHHDL 3.0 02/23/2024    VLDL 18 02/23/2024        OTHERS-  Lab Results   Component Value Date    HGBA1C 5.4 02/23/2024        I personally reviewed the patient's recent vitals, labs, medications, orders, EKGs, pertinent cardiac imaging/ echocardiography and ischemic evaluations including stress testing/ cardiac  catheterization.    Assessment and Plan:    Arie Allen is a 64 y.o. male with pertinent history of hypertension, dyslipidemia, osteoarthritis, back pain, history of obstructive coronary artery disease status post stenting with bare-metal stent March 2009 with in-stent restenosis drug-eluting stent July 2009, preserved ejection fraction 55 to 60% with impaired relaxation echo performed 3/13/2024, no clear ischemia with LVEF of 61% on nuclear stress test performed 3/22/2024 presents to cardiology clinic for follow-up.  He is doing relatively well.  Early morning diaphoresis has largely improved.  We reviewed and discussed his recent echo and stress test results.  No clear chest discomfort.      Please continue current cardiac medication including aspirin 81 mg daily, atorvastatin 80 mg daily, ezetimibe 10 mg daily, combination lisinopril 20 mg hydrochlorothiazide 12.5 mg daily, Toprol tartrate 25 mg, sublingual nitroglycerin as needed for chest pain.    Please followup with me in Cardiology clinic within the next 7-8 months.  Please return to clinic sooner or seek emergent care if your symptoms reoccur or worsen.    Thank you for allowing me to participate in their care.  Please feel free to call me with any further questions or concerns.        Lc Burt MD, FACC, BASIA, KELI  Division of Cardiovascular Medicine  Medical Director, [facility] [place]  Clinical , [facility] [employer]  [email or web address]   Office:  [phone]

## 2024-09-17 DIAGNOSIS — I25.118 CORONARY ARTERY DISEASE OF NATIVE ARTERY OF NATIVE HEART WITH STABLE ANGINA PECTORIS: Primary | ICD-10-CM

## 2024-09-17 RX ORDER — METOPROLOL TARTRATE 25 MG/1
25 TABLET, FILM COATED ORAL DAILY
Qty: 90 TABLET | Refills: 3 | Status: SHIPPED | OUTPATIENT
Start: 2024-09-17 | End: 2025-09-17

## 2024-09-17 RX ORDER — ASPIRIN 81 MG/1
81 TABLET ORAL DAILY
Qty: 90 TABLET | Refills: 3 | Status: SHIPPED | OUTPATIENT
Start: 2024-09-17 | End: 2025-09-17

## 2024-09-17 RX ORDER — EZETIMIBE 10 MG/1
10 TABLET ORAL DAILY
Qty: 90 TABLET | Refills: 3 | Status: SHIPPED | OUTPATIENT
Start: 2024-09-17 | End: 2025-09-17

## 2024-09-17 RX ORDER — ATORVASTATIN CALCIUM 80 MG/1
80 TABLET, FILM COATED ORAL DAILY
Qty: 90 TABLET | Refills: 3 | Status: SHIPPED | OUTPATIENT
Start: 2024-09-17 | End: 2025-09-17

## 2024-09-17 RX ORDER — LISINOPRIL AND HYDROCHLOROTHIAZIDE 12.5; 2 MG/1; MG/1
1 TABLET ORAL DAILY
Qty: 90 TABLET | Refills: 3 | Status: SHIPPED | OUTPATIENT
Start: 2024-09-17 | End: 2025-09-17

## 2024-09-18 RX ORDER — ATORVASTATIN CALCIUM 80 MG/1
TABLET, FILM COATED ORAL
Refills: 0 | OUTPATIENT
Start: 2024-09-18

## 2024-09-18 RX ORDER — METOPROLOL TARTRATE 25 MG/1
TABLET, FILM COATED ORAL
Refills: 0 | OUTPATIENT
Start: 2024-09-18

## 2024-09-18 RX ORDER — EZETIMIBE 10 MG/1
TABLET ORAL
Refills: 0 | OUTPATIENT
Start: 2024-09-18

## 2024-09-18 RX ORDER — LISINOPRIL AND HYDROCHLOROTHIAZIDE 12.5; 2 MG/1; MG/1
TABLET ORAL
Refills: 0 | OUTPATIENT
Start: 2024-09-18

## 2024-09-24 ENCOUNTER — APPOINTMENT (OUTPATIENT)
Dept: PRIMARY CARE | Facility: CLINIC | Age: 64
End: 2024-09-24
Payer: COMMERCIAL

## 2024-10-30 ENCOUNTER — APPOINTMENT (OUTPATIENT)
Dept: PRIMARY CARE | Facility: CLINIC | Age: 64
End: 2024-10-30
Payer: COMMERCIAL

## 2024-10-30 ENCOUNTER — TELEPHONE (OUTPATIENT)
Dept: PRIMARY CARE | Facility: CLINIC | Age: 64
End: 2024-10-30

## 2024-10-30 VITALS
SYSTOLIC BLOOD PRESSURE: 102 MMHG | BODY MASS INDEX: 28.14 KG/M2 | WEIGHT: 201 LBS | HEIGHT: 71 IN | DIASTOLIC BLOOD PRESSURE: 46 MMHG | HEART RATE: 57 BPM

## 2024-10-30 DIAGNOSIS — I10 ESSENTIAL HYPERTENSION: Primary | ICD-10-CM

## 2024-10-30 DIAGNOSIS — M79.605 LEG PAIN, ANTERIOR, LEFT: ICD-10-CM

## 2024-10-30 DIAGNOSIS — N52.8 OTHER MALE ERECTILE DYSFUNCTION: ICD-10-CM

## 2024-10-30 PROCEDURE — 3078F DIAST BP <80 MM HG: CPT | Performed by: FAMILY MEDICINE

## 2024-10-30 PROCEDURE — 99214 OFFICE O/P EST MOD 30 MIN: CPT | Performed by: FAMILY MEDICINE

## 2024-10-30 PROCEDURE — 3008F BODY MASS INDEX DOCD: CPT | Performed by: FAMILY MEDICINE

## 2024-10-30 PROCEDURE — 1036F TOBACCO NON-USER: CPT | Performed by: FAMILY MEDICINE

## 2024-10-30 PROCEDURE — 3074F SYST BP LT 130 MM HG: CPT | Performed by: FAMILY MEDICINE

## 2024-10-30 RX ORDER — SILDENAFIL 100 MG/1
100 TABLET, FILM COATED ORAL AS NEEDED
Qty: 30 TABLET | Refills: 4 | Status: SHIPPED | OUTPATIENT
Start: 2024-10-30

## 2024-10-30 ASSESSMENT — ENCOUNTER SYMPTOMS
LOSS OF SENSATION IN FEET: 0
DEPRESSION: 0
OCCASIONAL FEELINGS OF UNSTEADINESS: 0

## 2025-01-29 ENCOUNTER — OFFICE VISIT (OUTPATIENT)
Dept: CARDIOLOGY | Facility: CLINIC | Age: 65
End: 2025-01-29
Payer: MEDICARE

## 2025-01-29 VITALS
HEIGHT: 71 IN | OXYGEN SATURATION: 99 % | SYSTOLIC BLOOD PRESSURE: 135 MMHG | WEIGHT: 201 LBS | HEART RATE: 63 BPM | BODY MASS INDEX: 28.14 KG/M2 | DIASTOLIC BLOOD PRESSURE: 72 MMHG | RESPIRATION RATE: 20 BRPM

## 2025-01-29 DIAGNOSIS — Z95.5 HISTORY OF HEART ARTERY STENT: Primary | ICD-10-CM

## 2025-01-29 DIAGNOSIS — I25.118 CORONARY ARTERY DISEASE OF NATIVE ARTERY OF NATIVE HEART WITH STABLE ANGINA PECTORIS: ICD-10-CM

## 2025-01-29 DIAGNOSIS — I10 PRIMARY HYPERTENSION: ICD-10-CM

## 2025-01-29 DIAGNOSIS — I25.10 ARTERIOSCLEROSIS OF CORONARY ARTERY: ICD-10-CM

## 2025-01-29 DIAGNOSIS — E78.00 PURE HYPERCHOLESTEROLEMIA: ICD-10-CM

## 2025-01-29 DIAGNOSIS — I10 ESSENTIAL HYPERTENSION: ICD-10-CM

## 2025-01-29 DIAGNOSIS — R06.09 DOE (DYSPNEA ON EXERTION): ICD-10-CM

## 2025-01-29 DIAGNOSIS — E78.2 MIXED HYPERLIPIDEMIA: ICD-10-CM

## 2025-01-29 PROCEDURE — G2211 COMPLEX E/M VISIT ADD ON: HCPCS | Performed by: STUDENT IN AN ORGANIZED HEALTH CARE EDUCATION/TRAINING PROGRAM

## 2025-01-29 PROCEDURE — 3075F SYST BP GE 130 - 139MM HG: CPT | Performed by: STUDENT IN AN ORGANIZED HEALTH CARE EDUCATION/TRAINING PROGRAM

## 2025-01-29 PROCEDURE — 3008F BODY MASS INDEX DOCD: CPT | Performed by: STUDENT IN AN ORGANIZED HEALTH CARE EDUCATION/TRAINING PROGRAM

## 2025-01-29 PROCEDURE — 1036F TOBACCO NON-USER: CPT | Performed by: STUDENT IN AN ORGANIZED HEALTH CARE EDUCATION/TRAINING PROGRAM

## 2025-01-29 PROCEDURE — 99215 OFFICE O/P EST HI 40 MIN: CPT | Performed by: STUDENT IN AN ORGANIZED HEALTH CARE EDUCATION/TRAINING PROGRAM

## 2025-01-29 PROCEDURE — 3078F DIAST BP <80 MM HG: CPT | Performed by: STUDENT IN AN ORGANIZED HEALTH CARE EDUCATION/TRAINING PROGRAM

## 2025-01-29 RX ORDER — LISINOPRIL 20 MG/1
20 TABLET ORAL DAILY
Qty: 90 TABLET | Refills: 3 | Status: SHIPPED | OUTPATIENT
Start: 2025-01-29 | End: 2026-01-29

## 2025-01-29 RX ORDER — METOPROLOL TARTRATE 25 MG/1
25 TABLET, FILM COATED ORAL 2 TIMES DAILY
Qty: 180 TABLET | Refills: 3 | Status: SHIPPED | OUTPATIENT
Start: 2025-01-29 | End: 2026-01-29

## 2025-01-29 ASSESSMENT — ENCOUNTER SYMPTOMS
LOSS OF SENSATION IN FEET: 0
OCCASIONAL FEELINGS OF UNSTEADINESS: 0
DEPRESSION: 0

## 2025-01-29 ASSESSMENT — PATIENT HEALTH QUESTIONNAIRE - PHQ9
SUM OF ALL RESPONSES TO PHQ9 QUESTIONS 1 AND 2: 0
1. LITTLE INTEREST OR PLEASURE IN DOING THINGS: NOT AT ALL
2. FEELING DOWN, DEPRESSED OR HOPELESS: NOT AT ALL

## 2025-01-29 NOTE — PATIENT INSTRUCTIONS
We did discuss that your dizziness and fall when going from sitting to standing sounds like orthostatic hypotension where blood pressure can suddenly drop when you stand up.      We will plan to discontinue combined lisinopril 20 mg and hydrochlorothiazide 12.5 mg once daily.  In its place I will send in a refill for lisinopril 20 mg daily.    We did discuss that if you had further falls or near passing out episodes, we would have a low threshold to consider an event heart monitor.    Please continue current cardiac medication including aspirin 81 mg daily, atorvastatin 80 mg daily, ezetimibe 10 mg daily, combination lisinopril 20 mg, metoprolol tartrate 25 mg twice daily, sublingual nitroglycerin as needed for chest pain.  Of note, Viagra and sublingual nitroglycerin should not be used within 24 to 48 hours of each other.    Please follow-up with your [facility] cardiologist within the next 6 months.  Please followup with me in Cardiology clinic as needed.  Please return to clinic sooner or seek emergent care if your symptoms reoccur or worsen.

## 2025-01-29 NOTE — PROGRESS NOTES
Follow-up     HPI:    Arie Allen is a 64 y.o. male with pertinent history of hypertension, dyslipidemia, osteoarthritis, back pain, history of obstructive coronary artery disease status post stenting with bare-metal stent March 2009 with in-stent restenosis drug-eluting stent July 2009, preserved ejection fraction 55 to 60% with impaired relaxation echo performed 3/13/2024, no clear ischemia with LVEF of 61% on nuclear stress test performed 3/22/2024 presents to cardiology clinic for follow-up.     He is doing relatively well but does note that he had a episode of dizziness with a fall about a month ago.  He was getting out of bed quickly to take his dogs out and says he got dizzy and then fell injuring his wrist and head.  He did not seek immediate medical attention.  No further episodes since then.  He does note sometimes he does not stay well-hydrated.  We reviewed his home blood pressure log.  E We reviewed and discussed his prior echo and stress test results.  No clear chest discomfort.  No exacerbating or relieving factors.  Pt denies orthopnea, and paroxysmal nocturnal dyspnea.  Pt denies worsening lower extremity edema.  Pt denies palpitations or syncope.  No recent falls.  No fever or chills.  No cough.  No change in bowel or bladder habits.  No sick contacts.  No recent travel.    12 point review of systems including (Constitutional, Eyes, ENMT, Respiratory, Cardiac, Gastrointestinal, Neurological, Psychiatric, and Hematologic) was performed and is otherwise negative.    Past medical history reviewed    Past surgical history reviewed    Social history reviewed:   reports that he has quit smoking. His smoking use included cigarettes. He started smoking about 16 years ago. He has never used smokeless tobacco. He reports current alcohol use. He reports current drug use. Frequency: 8.00 times per week. Drug: Marijuana.     Family history:  No sudden cardiac death or premature coronary artery disease.       Allergies reviewed: Patient has no known allergies.     Medications reviewed:   Current Outpatient Medications   Medication Instructions    aspirin (ECOTRIN LOW STRENGTH) 81 mg, oral, Daily    atorvastatin (LIPITOR) 80 mg, oral, Daily    ezetimibe (ZETIA) 10 mg, oral, Daily    lisinopriL-hydrochlorothiazide 20-12.5 mg tablet 1 tablet, oral, Daily    metoprolol tartrate (LOPRESSOR) 25 mg, oral, Daily    nitroglycerin (NITROSTAT) 0.4 mg    sildenafil (VIAGRA) 100 mg, oral, As needed        Vitals reviewed: There were no vitals taken for this visit.      Physical Exam:   General:  Patient is awake, alert, and oriented.  Patient is in no acute distress.  HEENT:  Pupils equal and reactive.  Normocephalic.  Moist mucosa.    Neck:  No thyromegaly.  Normal Jugular Venous Pressure.  Cardiovascular:  Regular rate and rhythm.  Normal S1 and S2.  1/6 KARIN.  Pulmonary:  Clear to auscultation bilaterally.  Abdomen:  Soft. Non-tender.   Non-distended.  Positive bowel sounds.  Lower Extremities:  2+ pedal pulses. No LE edema.  Neurologic:  Cranial nerves intact.  No focal deficit.   Skin: Skin warm and dry, normal skin turgor.   Psychiatric: Normal affect.    Last Labs:  CBC -      Lab Results   Component Value Date    WBC 5.9 02/23/2024    HGB 14.5 02/23/2024    HCT 43.1 02/23/2024     02/23/2024        CMP-  Lab Results   Component Value Date    GLUCOSE 106 (H) 02/23/2024     02/23/2024    K 4.5 02/23/2024     (H) 02/23/2024    CO2 28 02/23/2024    ANIONGAP 14 02/23/2024    BUN 15 02/23/2024    CREATININE 0.98 02/23/2024    EGFR 87 02/23/2024    CALCIUM 9.5 02/23/2024    PROT 6.7 02/23/2024    ALBUMIN 4.3 02/23/2024    AST 25 02/23/2024    ALT 59 (H) 02/23/2024    ALKPHOS 70 02/23/2024    BILITOT 0.6 02/23/2024        LIPIDS-  Lab Results   Component Value Date    CHOL 107 02/23/2024    TRIG 92 02/23/2024    HDL 35.8 02/23/2024    CHHDL 3.0 02/23/2024    VLDL 18 02/23/2024        OTHERS-  Lab Results    Component Value Date    HGBA1C 5.4 02/23/2024        I personally reviewed the patient's recent vitals, labs, medications, orders, EKGs, pertinent cardiac imaging/ echocardiography and ischemic evaluations including stress testing/ cardiac catheterization.    Assessment and Plan:    Arie Allen is a 64 y.o. male with pertinent history of hypertension, dyslipidemia, osteoarthritis, back pain, history of obstructive coronary artery disease status post stenting with bare-metal stent March 2009 with in-stent restenosis drug-eluting stent July 2009, preserved ejection fraction 55 to 60% with impaired relaxation echo performed 3/13/2024, no clear ischemia with LVEF of 61% on nuclear stress test performed 3/22/2024 presents to cardiology clinic for follow-up.  He is doing relatively well but does note that he had a episode of dizziness with a fall about a month ago.  He was getting out of bed quickly to take his dogs out and says he got dizzy and then fell injuring his wrist and head.  He did not seek immediate medical attention.  No further episodes since then.  He does note sometimes he does not stay well-hydrated.  We reviewed his home blood pressure log.  E We reviewed and discussed his prior echo and stress test results.      We did discuss that your dizziness and fall when going from sitting to standing sounds like orthostatic hypotension where blood pressure can suddenly drop when you stand up.      We will plan to discontinue combined lisinopril 20 mg and hydrochlorothiazide 12.5 mg once daily.  In its place I will send in a refill for lisinopril 20 mg daily.    We did discuss that if you had further falls or near passing out episodes, we would have a low threshold to consider an event heart monitor.    Please continue current cardiac medication including aspirin 81 mg daily, atorvastatin 80 mg daily, ezetimibe 10 mg daily, combination lisinopril 20 mg, metoprolol tartrate 25 mg twice daily, sublingual  nitroglycerin as needed for chest pain.  Of note, Viagra and sublingual nitroglycerin should not be used within 24 to 48 hours of each other.    Please follow-up with your [facility] cardiologist within the next 6 months.  Please followup with me in Cardiology clinic as needed.  Please return to clinic sooner or seek emergent care if your symptoms reoccur or worsen.    Thank you for allowing me to participate in their care.  Please feel free to call me with any further questions or concerns.        Lc Burt MD, FACC, KELI FLOR  Division of Cardiovascular Medicine  Medical Director, [facility] [place]  Clinical , [facility] [employer]  [email or web address]   Office:  [phone]